# Patient Record
Sex: FEMALE | Race: WHITE | NOT HISPANIC OR LATINO
[De-identification: names, ages, dates, MRNs, and addresses within clinical notes are randomized per-mention and may not be internally consistent; named-entity substitution may affect disease eponyms.]

---

## 2022-01-21 PROBLEM — Z00.00 ENCOUNTER FOR PREVENTIVE HEALTH EXAMINATION: Status: ACTIVE | Noted: 2022-01-21

## 2022-01-25 ENCOUNTER — NON-APPOINTMENT (OUTPATIENT)
Age: 67
End: 2022-01-25

## 2022-01-25 ENCOUNTER — APPOINTMENT (OUTPATIENT)
Dept: GASTROENTEROLOGY | Facility: CLINIC | Age: 67
End: 2022-01-25
Payer: MEDICARE

## 2022-01-25 VITALS
OXYGEN SATURATION: 98 % | TEMPERATURE: 97.8 F | HEIGHT: 68 IN | HEART RATE: 89 BPM | WEIGHT: 138 LBS | BODY MASS INDEX: 20.92 KG/M2 | SYSTOLIC BLOOD PRESSURE: 90 MMHG | DIASTOLIC BLOOD PRESSURE: 60 MMHG

## 2022-01-25 PROCEDURE — 36415 COLL VENOUS BLD VENIPUNCTURE: CPT

## 2022-01-25 PROCEDURE — 99204 OFFICE O/P NEW MOD 45 MIN: CPT | Mod: 25

## 2022-01-26 LAB
ALBUMIN SERPL ELPH-MCNC: 4.5 G/DL
ALP BLD-CCNC: 54 U/L
ALT SERPL-CCNC: 12 U/L
ANION GAP SERPL CALC-SCNC: 13 MMOL/L
AST SERPL-CCNC: 14 U/L
BASOPHILS # BLD AUTO: 0.04 K/UL
BASOPHILS NFR BLD AUTO: 0.6 %
BILIRUB SERPL-MCNC: 0.2 MG/DL
BUN SERPL-MCNC: 16 MG/DL
CALCIUM SERPL-MCNC: 9.9 MG/DL
CHLORIDE SERPL-SCNC: 107 MMOL/L
CO2 SERPL-SCNC: 23 MMOL/L
CREAT SERPL-MCNC: 0.9 MG/DL
CRP SERPL-MCNC: <3 MG/L
EOSINOPHIL # BLD AUTO: 0.07 K/UL
EOSINOPHIL NFR BLD AUTO: 1 %
ERYTHROCYTE [SEDIMENTATION RATE] IN BLOOD BY WESTERGREN METHOD: 2 MM/HR
GLUCOSE SERPL-MCNC: 99 MG/DL
HCT VFR BLD CALC: 41.1 %
HGB BLD-MCNC: 12.6 G/DL
IMM GRANULOCYTES NFR BLD AUTO: 0.3 %
LYMPHOCYTES # BLD AUTO: 1.6 K/UL
LYMPHOCYTES NFR BLD AUTO: 24 %
MAN DIFF?: NORMAL
MCHC RBC-ENTMCNC: 30.7 GM/DL
MCHC RBC-ENTMCNC: 31.7 PG
MCV RBC AUTO: 103.5 FL
MONOCYTES # BLD AUTO: 0.42 K/UL
MONOCYTES NFR BLD AUTO: 6.3 %
NEUTROPHILS # BLD AUTO: 4.53 K/UL
NEUTROPHILS NFR BLD AUTO: 67.8 %
PLATELET # BLD AUTO: 301 K/UL
POTASSIUM SERPL-SCNC: 4.9 MMOL/L
PROT SERPL-MCNC: 6.7 G/DL
RBC # BLD: 3.97 M/UL
RBC # FLD: 11.9 %
SODIUM SERPL-SCNC: 143 MMOL/L
TSH SERPL-ACNC: 2.55 UIU/ML
WBC # FLD AUTO: 6.68 K/UL

## 2022-01-27 LAB
GLIADIN IGA SER QL: <5 UNITS
GLIADIN IGG SER QL: <5 UNITS
GLIADIN PEPTIDE IGA SER-ACNC: NEGATIVE
GLIADIN PEPTIDE IGG SER-ACNC: NEGATIVE

## 2022-02-01 LAB
IGA SER QL IEP: 69 MG/DL
TTG IGA SER IA-ACNC: <1.2 U/ML
TTG IGA SER-ACNC: NEGATIVE
TTG IGG SER IA-ACNC: <1.2 U/ML
TTG IGG SER IA-ACNC: NEGATIVE

## 2022-02-07 ENCOUNTER — APPOINTMENT (OUTPATIENT)
Dept: GASTROENTEROLOGY | Facility: CLINIC | Age: 67
End: 2022-02-07

## 2022-02-08 ENCOUNTER — APPOINTMENT (OUTPATIENT)
Dept: GASTROENTEROLOGY | Facility: CLINIC | Age: 67
End: 2022-02-08
Payer: MEDICARE

## 2022-02-08 DIAGNOSIS — R19.7 DIARRHEA, UNSPECIFIED: ICD-10-CM

## 2022-02-08 PROCEDURE — 36415 COLL VENOUS BLD VENIPUNCTURE: CPT

## 2022-02-10 LAB
C DIFF TOX GENS STL QL NAA+PROBE: NORMAL
CDIFF BY PCR: NOT DETECTED
DEPRECATED KAPPA LC FREE/LAMBDA SER: 1.7 RATIO
FOLATE SERPL-MCNC: 7.6 NG/ML
G LAMBLIA AG STL QL: NORMAL
IGA SER QL IEP: 70 MG/DL
IGG SER QL IEP: 754 MG/DL
IGM SER QL IEP: 146 MG/DL
KAPPA LC CSF-MCNC: 1.33 MG/DL
KAPPA LC SERPL-MCNC: 2.26 MG/DL
VIT B12 SERPL-MCNC: 266 PG/ML

## 2022-02-10 NOTE — ASSESSMENT
[FreeTextEntry1] : positive cologuard, colonoscopy r/o neoplasm\par \par Risks (including but not limited to sedation risks, infection, bleeding, perforation, possibility of missed lesions), benefits and alternatives to procedure, including not doing the procedure, were discussed with patient. Patient understood and agreed to proceed with colonoscopy. \par Colonoscopy preparation instructions reviewed with patient.\par \par 2 Dulcolax two days prior to procedure + Split MiraLAX/Dulcolax preparation \par \par \par Diarrhea, possible IBS, \par labs/stool studies\par Colonoscopy with biopsy , r/o IBD

## 2022-02-10 NOTE — HISTORY OF PRESENT ILLNESS
[FreeTextEntry1] : 66 year old F h/o borderline ovarian ca, s/p oophorectomy/omentum/tubes//appendix no chemo/xrt, bilateral inguinal hernia repair, csection, s/p choley, presents for evaluation of positive cologuard test. \par \par she has copy of result on her phone. \par \par bouts of diarrhea since October. she also had foul smelling stool. \par PMD tested stool for bacteria normal. \par she then requested cologuard , came back positive earlier this month\par continues to have diarrhea off/on. She has h/o chronic fecal urgency. \par \par three prior colonoscopies, last colonoscopy 2017 with Dr. Alanis, normal. \par \par Patient denies abdominal pain , n/v/heartburn, reflux, dysphagia/odynophagia, change in bowel habits, constipation, brbpr, melena. no weight loss.  Good appetite and energy level. Patient has daily BM, denies regular NSAID use. \par \par PMD Dr. Aron Junior\par \par fam hx\par paternal gm- CRC,  in her 50s.

## 2022-02-10 NOTE — ADDENDUM
[FreeTextEntry1] : 2/10- spoke with patient. reviewed blood work, low IgA, elevated kappa free light chains. \par low b12. \par referred to hematology for further evaluation. \par stool tests thus far negative.

## 2022-02-11 ENCOUNTER — TRANSCRIPTION ENCOUNTER (OUTPATIENT)
Age: 67
End: 2022-02-11

## 2022-02-12 LAB — DEPRECATED O AND P PREP STL: NORMAL

## 2022-02-13 LAB — BACTERIA STL CULT: NORMAL

## 2022-02-16 LAB
CALPROTECTIN FECAL: <16 UG/G
PANCREATIC ELASTASE, FECAL: >500

## 2022-03-04 ENCOUNTER — APPOINTMENT (OUTPATIENT)
Dept: HEMATOLOGY ONCOLOGY | Facility: CLINIC | Age: 67
End: 2022-03-04
Payer: MEDICARE

## 2022-03-04 ENCOUNTER — RESULT REVIEW (OUTPATIENT)
Age: 67
End: 2022-03-04

## 2022-03-04 VITALS
HEART RATE: 16 BPM | DIASTOLIC BLOOD PRESSURE: 75 MMHG | RESPIRATION RATE: 18 BRPM | TEMPERATURE: 97.3 F | WEIGHT: 139.13 LBS | OXYGEN SATURATION: 97 % | HEIGHT: 68 IN | SYSTOLIC BLOOD PRESSURE: 104 MMHG | BODY MASS INDEX: 21.09 KG/M2

## 2022-03-04 DIAGNOSIS — R19.5 OTHER FECAL ABNORMALITIES: ICD-10-CM

## 2022-03-04 DIAGNOSIS — R10.9 UNSPECIFIED ABDOMINAL PAIN: ICD-10-CM

## 2022-03-04 DIAGNOSIS — R82.90 UNSPECIFIED ABNORMAL FINDINGS IN URINE: ICD-10-CM

## 2022-03-04 DIAGNOSIS — N63.0 UNSPECIFIED LUMP IN UNSPECIFIED BREAST: ICD-10-CM

## 2022-03-04 DIAGNOSIS — N60.19 DIFFUSE CYSTIC MASTOPATHY OF UNSPECIFIED BREAST: ICD-10-CM

## 2022-03-04 DIAGNOSIS — C56.9 MALIGNANT NEOPLASM OF UNSPECIFIED OVARY: ICD-10-CM

## 2022-03-04 PROCEDURE — 36415 COLL VENOUS BLD VENIPUNCTURE: CPT

## 2022-03-04 PROCEDURE — 99205 OFFICE O/P NEW HI 60 MIN: CPT | Mod: 25

## 2022-03-04 NOTE — HISTORY OF PRESENT ILLNESS
[de-identified] : Ms. Orin Villegas is 66 year old female referred by Rosalia Heller for abnormal lab work.  \par No symptoms other than diarrhea and foul smelling stools and urine +Coluguard.  No recent fevers or infections \par \par Patient is a retired .  \par \par Patient with past medical history of ovarian cancer in  s/p oophorectomy/omentum/tubes/appendix but no chemo or radiation.  Cystic breasts, ovaries.  \par \par Surgical history: \par Gallbladder Removal \par  \par LEEP procedure in cervix .\par \par Family History: \par Paternal grandmother: Colon cancer? 50s \par Father: Sinus Cancer 73\par \par Menarche: 16   \par A3\par Oral birth control for about a year 40 years ago\par IVF 27 years ago for 2 years \par  \par 2/10/22  FLCR 1.70  Kappa - 2.26 \par 22 mcv 103.5 Hgb 12.6 hct 41.1

## 2022-03-04 NOTE — ASSESSMENT
[FreeTextEntry1] : Macrocytosis without anemia\par Elevated free light chains\par Symptoms: Fatigue, early satiety, foul smelling stool/urine\par Had positive cologuard\par Scheduled for colonoscopy 3/15/22\par Send SPEP and IFx\par B12, folate, PROSPER, ESR, CRP\par \par "Borderline" Ovarian cancer\par Diagnosed 2005\par s/p KRISTINA BSO at [facility] [address] [place]\par Was told Stage IIIC\par Was never offered chemotherapy\par Was getting annual CT until Last CT 2018\par Obtain outside records\par Repeat scans given symptomatology\par \par Social hx\[place]par Retired 12 months back\par Work contracts for \par Quit smoking 1 ppd x 10 years. Quit 27 years ago\par She admits to being hypochondriac\par \par Family Hx of cancer\par P GM throat/ thyroid\par Father sinus cancer\par Paternal aunt- breast cancer 60\par Sister - lung -smoker\par Interested in genetic testing\par Send invitae 47 gene panel\par \par Patient had multiple questions which were answered to satisfaction\par \par Follow up in a few weeks\par No labs

## 2022-03-04 NOTE — PHYSICAL EXAM
[Restricted in physically strenuous activity but ambulatory and able to carry out work of a light or sedentary nature] : Status 1- Restricted in physically strenuous activity but ambulatory and able to carry out work of a light or sedentary nature, e.g., light house work, office work [Normal] : affect appropriate [de-identified] : Dense cystic breast. No obvious masses bL

## 2022-03-11 ENCOUNTER — APPOINTMENT (OUTPATIENT)
Dept: ORTHOPEDIC SURGERY | Facility: CLINIC | Age: 67
End: 2022-03-11
Payer: MEDICARE

## 2022-03-11 VITALS — HEIGHT: 68 IN | RESPIRATION RATE: 16 BRPM | BODY MASS INDEX: 21.07 KG/M2 | WEIGHT: 139 LBS

## 2022-03-11 DIAGNOSIS — M72.0 PALMAR FASCIAL FIBROMATOSIS [DUPUYTREN]: ICD-10-CM

## 2022-03-11 PROCEDURE — 73130 X-RAY EXAM OF HAND: CPT | Mod: 50

## 2022-03-11 PROCEDURE — 99204 OFFICE O/P NEW MOD 45 MIN: CPT

## 2022-03-12 ENCOUNTER — RESULT REVIEW (OUTPATIENT)
Age: 67
End: 2022-03-12

## 2022-03-14 ENCOUNTER — RESULT REVIEW (OUTPATIENT)
Age: 67
End: 2022-03-14

## 2022-03-15 ENCOUNTER — RESULT REVIEW (OUTPATIENT)
Age: 67
End: 2022-03-15

## 2022-03-15 ENCOUNTER — APPOINTMENT (OUTPATIENT)
Dept: GASTROENTEROLOGY | Facility: HOSPITAL | Age: 67
End: 2022-03-15
Payer: MEDICARE

## 2022-03-15 ENCOUNTER — NON-APPOINTMENT (OUTPATIENT)
Age: 67
End: 2022-03-15

## 2022-03-15 PROCEDURE — 45380 COLONOSCOPY AND BIOPSY: CPT

## 2022-03-25 ENCOUNTER — APPOINTMENT (OUTPATIENT)
Dept: HEMATOLOGY ONCOLOGY | Facility: CLINIC | Age: 67
End: 2022-03-25
Payer: MEDICARE

## 2022-03-25 VITALS
BODY MASS INDEX: 21 KG/M2 | HEIGHT: 68 IN | WEIGHT: 138.56 LBS | RESPIRATION RATE: 18 BRPM | TEMPERATURE: 98.3 F | SYSTOLIC BLOOD PRESSURE: 95 MMHG | DIASTOLIC BLOOD PRESSURE: 64 MMHG | OXYGEN SATURATION: 97 % | HEART RATE: 95 BPM

## 2022-03-25 DIAGNOSIS — R76.8 OTHER SPECIFIED ABNORMAL IMMUNOLOGICAL FINDINGS IN SERUM: ICD-10-CM

## 2022-03-25 PROCEDURE — 99214 OFFICE O/P EST MOD 30 MIN: CPT | Mod: 25

## 2022-03-25 NOTE — ASSESSMENT
[FreeTextEntry1] : Macrocytosis without anemia\par Elevated free light chains\par Symptoms: Fatigue, early satiety, foul smelling stool/urine\par Had positive cologuard\par S/p colonoscopy 3/15/22 - tubular adenomas\par B12, folate, PROSPER, ESR, CRP- unremarkable\par SPEP, IFx- no monoclonal protein\par Slightly elevated Kappa chains. Cannot absolutely rule out MGUS, but even if we diagnose it, most likely we will just monitor it. Discussed about bone marrow\par She prefers to monitor\par \par "Borderline" Ovarian cancer\par Diagnosed 2005\par s/p KRISTINA BSO at [facility] [place]\par Was told Stage IIIC\par Was never offered chemotherapy\par Was getting annual CT until Last CT 2018\par Obtain outside records\par Refer to gyn\par Strongly encouraged to be uptodate with mammogram\par \par Social hx\par [place]par Retired 12 months back\par Work contracts for \par Quit smoking 1 ppd x 10 years. Quit 27 years ago\par She admits to being hypochondriac\par \par Family Hx of cancer\par P GM throat/ thyroid\par Father sinus cancer\par Paternal aunt- breast cancer 60\par Sister - lung -smoker\par invitae 47 gene panel- negative\par \par Patient had multiple questions which were answered to satisfaction\par \par Labs in 6 months\par CBC, CMP, myeloma panel, \par OV few days later

## 2022-03-25 NOTE — HISTORY OF PRESENT ILLNESS
[de-identified] : Ms. Orin Villegas is 66 year old female referred by Rosalia Heller for abnormal lab work.  \par No symptoms other than diarrhea and foul smelling stools and urine +Coluguard.  No recent fevers or infections \par \par Patient is a retired .  \par \par Patient with past medical history of ovarian cancer in  s/p oophorectomy/omentum/tubes/appendix but no chemo or radiation.  Cystic breasts, ovaries.  \par \par Surgical history: \par Gallbladder Removal \par  \par LEEP procedure in cervix .\par \par Family History: \par Paternal grandmother: Colon cancer? 50s \par Father: Sinus Cancer 73\par \par Menarche: 16   \par A3\par Oral birth control for about a year 40 years ago\par IVF 27 years ago for 2 years \par  \par 2/10/22  FLCR 1.70  Kappa - 2.26 \par 22 mcv 103.5 Hgb 12.6 hct 41.1 [de-identified] : Patient is seen today for follow up\par \par No new symptoms

## 2022-05-18 DIAGNOSIS — Z87.19 PERSONAL HISTORY OF OTHER DISEASES OF THE DIGESTIVE SYSTEM: ICD-10-CM

## 2022-05-18 DIAGNOSIS — K92.1 MELENA: ICD-10-CM

## 2022-05-21 ENCOUNTER — RESULT REVIEW (OUTPATIENT)
Age: 67
End: 2022-05-21

## 2022-05-23 ENCOUNTER — RESULT REVIEW (OUTPATIENT)
Age: 67
End: 2022-05-23

## 2022-05-24 ENCOUNTER — APPOINTMENT (OUTPATIENT)
Dept: GASTROENTEROLOGY | Facility: HOSPITAL | Age: 67
End: 2022-05-24
Payer: MEDICARE

## 2022-05-24 PROCEDURE — 43239 EGD BIOPSY SINGLE/MULTIPLE: CPT

## 2022-06-09 ENCOUNTER — APPOINTMENT (OUTPATIENT)
Dept: GASTROENTEROLOGY | Facility: CLINIC | Age: 67
End: 2022-06-09
Payer: MEDICARE

## 2022-06-09 VITALS
WEIGHT: 140 LBS | DIASTOLIC BLOOD PRESSURE: 78 MMHG | HEART RATE: 77 BPM | BODY MASS INDEX: 21.22 KG/M2 | SYSTOLIC BLOOD PRESSURE: 114 MMHG | HEIGHT: 68 IN | TEMPERATURE: 97.9 F

## 2022-06-09 DIAGNOSIS — R19.5 OTHER FECAL ABNORMALITIES: ICD-10-CM

## 2022-06-09 DIAGNOSIS — R10.13 EPIGASTRIC PAIN: ICD-10-CM

## 2022-06-09 DIAGNOSIS — K62.5 HEMORRHAGE OF ANUS AND RECTUM: ICD-10-CM

## 2022-06-09 PROCEDURE — 99214 OFFICE O/P EST MOD 30 MIN: CPT

## 2022-06-09 NOTE — ASSESSMENT
[FreeTextEntry1] : abd pain, change in bowel habits with denise colored stool\par -labs, CT\par \par h/o rectal bleeding, with report of both melena and chilango stools. unrevealing EGD, colonoscopy notable for hemorrhoids\par -check labs\par -VCE pending CT scan. \par

## 2022-06-09 NOTE — PHYSICAL EXAM
[General Appearance - Alert] : alert [General Appearance - In No Acute Distress] : in no acute distress [Sclera] : the sclera and conjunctiva were normal [Outer Ear] : the ears and nose were normal in appearance [Neck Appearance] : the appearance of the neck was normal [] : no respiratory distress [Apical Impulse] : the apical impulse was normal [Abdomen Soft] : soft [Abdomen Tenderness] : non-tender [Abnormal Walk] : normal gait [Skin Color & Pigmentation] : normal skin color and pigmentation [Oriented To Time, Place, And Person] : oriented to person, place, and time [FreeTextEntry1] : normal external exam, normal DARCY. scant amount of brown stool. Chaperoned by Jelani Magdaleno MA

## 2022-06-09 NOTE — HISTORY OF PRESENT ILLNESS
[FreeTextEntry1] : 67 year old F h/o borderline ovarian ca, s/p oophorectomy/omentum/tubes//appendix no chemo/xrt, bilateral inguinal hernia repair, csection, s/p choley, presents for follow up. \par \par no normal bm since 10/2022. \par baseline bowel patter n 2x per week. since 10/2022, daily bm, soft, formed, easy. also intermittent bouts of diarrhea\par smell has improved. \par She previously reported black stool with maroon blood. more recently she has had denise colored stools with red. \par Last denise stool was this morning, prior to this was 2-3 days ago. \par \par she had excruciating pain earlier this week, improved with a glass of milk. no other abd pain. \par feels full easily sometimes, weight is stable. \par \par Labs after visit in 2022:\par 2/10- spoke with patient. reviewed blood work, low IgA, elevated kappa free light chains. \par low b12. \par referred to hematology for further evaluation. found to possibly had MGUS, f/u scheduled in 2022\par stool tests thus far negative. \par \par Colonoscopy-2022 positive cologuard- diverticulosis, random bx unremarkable.\par subcentimeter colon polyps , path TA and hyperplastic\par \par EGD for h/o melena 2022\par grade A esophagitis, normal stomach, normal duodenum. \par path hyperplastic changes in distal esophagus, benign gastric bx\par \par three prior colonoscopies, last colonoscopy 2017 with Dr. Alanis, normal. \par \par Patient denies abdominal pain , n/v/heartburn, reflux, dysphagia/odynophagia, change in bowel habits, constipation, brbpr, melena. no weight loss.  Good appetite and energy level. Patient has daily BM, denies regular NSAID use. \par \par PMD Dr. Aron Junior\par \par fam hx\par paternal gm- CRC,  in her 50s.

## 2022-06-10 LAB
ALBUMIN SERPL ELPH-MCNC: 5 G/DL
ALP BLD-CCNC: 62 U/L
ALT SERPL-CCNC: 14 U/L
ANION GAP SERPL CALC-SCNC: 13 MMOL/L
AST SERPL-CCNC: 15 U/L
BASOPHILS # BLD AUTO: 0.03 K/UL
BASOPHILS NFR BLD AUTO: 0.4 %
BILIRUB SERPL-MCNC: 0.2 MG/DL
BUN SERPL-MCNC: 20 MG/DL
CALCIUM SERPL-MCNC: 9.8 MG/DL
CHLORIDE SERPL-SCNC: 105 MMOL/L
CO2 SERPL-SCNC: 25 MMOL/L
CREAT SERPL-MCNC: 1.17 MG/DL
EGFR: 51 ML/MIN/1.73M2
EOSINOPHIL # BLD AUTO: 0.12 K/UL
EOSINOPHIL NFR BLD AUTO: 1.6 %
GLUCOSE SERPL-MCNC: 101 MG/DL
HCT VFR BLD CALC: 39.2 %
HGB BLD-MCNC: 12.5 G/DL
IMM GRANULOCYTES NFR BLD AUTO: 0.5 %
LPL SERPL-CCNC: 60 U/L
LYMPHOCYTES # BLD AUTO: 1.57 K/UL
LYMPHOCYTES NFR BLD AUTO: 20.5 %
MAN DIFF?: NORMAL
MCHC RBC-ENTMCNC: 31.9 GM/DL
MCHC RBC-ENTMCNC: 32.5 PG
MCV RBC AUTO: 101.8 FL
MONOCYTES # BLD AUTO: 0.48 K/UL
MONOCYTES NFR BLD AUTO: 6.3 %
NEUTROPHILS # BLD AUTO: 5.41 K/UL
NEUTROPHILS NFR BLD AUTO: 70.7 %
PLATELET # BLD AUTO: 319 K/UL
POTASSIUM SERPL-SCNC: 4.7 MMOL/L
PROT SERPL-MCNC: 7.1 G/DL
RBC # BLD: 3.85 M/UL
RBC # FLD: 12.3 %
SODIUM SERPL-SCNC: 142 MMOL/L
WBC # FLD AUTO: 7.65 K/UL

## 2022-06-13 ENCOUNTER — RESULT REVIEW (OUTPATIENT)
Age: 67
End: 2022-06-13

## 2022-06-16 ENCOUNTER — APPOINTMENT (OUTPATIENT)
Dept: GASTROENTEROLOGY | Facility: HOSPITAL | Age: 67
End: 2022-06-16

## 2022-06-17 ENCOUNTER — APPOINTMENT (OUTPATIENT)
Dept: GYNECOLOGIC ONCOLOGY | Facility: CLINIC | Age: 67
End: 2022-06-17
Payer: MEDICARE

## 2022-06-17 VITALS
OXYGEN SATURATION: 97 % | HEIGHT: 68 IN | BODY MASS INDEX: 20.61 KG/M2 | SYSTOLIC BLOOD PRESSURE: 107 MMHG | HEART RATE: 89 BPM | WEIGHT: 136 LBS | RESPIRATION RATE: 18 BRPM | DIASTOLIC BLOOD PRESSURE: 73 MMHG

## 2022-06-17 DIAGNOSIS — Z80.3 FAMILY HISTORY OF MALIGNANT NEOPLASM OF BREAST: ICD-10-CM

## 2022-06-17 DIAGNOSIS — Z80.0 FAMILY HISTORY OF MALIGNANT NEOPLASM OF DIGESTIVE ORGANS: ICD-10-CM

## 2022-06-17 PROCEDURE — 99204 OFFICE O/P NEW MOD 45 MIN: CPT

## 2022-06-21 LAB
HPV 16 E6+E7 MRNA CVX QL NAA+PROBE: NOT DETECTED
HPV18+45 E6+E7 MRNA CVX QL NAA+PROBE: NOT DETECTED

## 2022-06-21 NOTE — PHYSICAL EXAM
[Chaperone Present] : A chaperone was present in the examining room during all aspects of the physical examination [Absent] : Adnexa(ae): Absent [Normal] : Recto-Vaginal Exam: Normal [de-identified] : soft [de-identified] : scant physiologic discharge, no odor, no lesions, no blood [de-identified] : pap obtained [de-identified] : small uterus, nontender, no masses [de-identified] : smooth septum, no masses, no blood [Fully active, able to carry on all pre-disease performance without restriction] : Status 0 - Fully active, able to carry on all pre-disease performance without restriction

## 2022-06-21 NOTE — HISTORY OF PRESENT ILLNESS
[FreeTextEntry1] : 66yo P1 here to establish care\par \par Dx: Borderline ovarian tumor dx'd 2005\par Tx: BSO. retained uterus but was told non-invasive implants on uterus\par Adj tx: none\par \par Patient has not seen a GYN in many years and desires to establish care given her history of borderline tumor and although she was diagnosed >15yrs ago, she is concerned because risk for recurrence of these tumors is usually 15-20yrs after diagnosis. She denies pain/fever/bleeding/bloating. She has normal activity tolerance and normal appetite. Reports normal urination. She does report + blood in stool undergoing GI work up. She explains that shes is In a new sexual relationship and partner w/ hx of HSV. She denies any vulvar or vaginal lesions. She does admit to a new vaginal discharge and odor since engaging in sexual activity with her new partner, denies itching/burning/bleeding. She desires HSV testing and general STI testing.\par \par Follows with GI for new BRBPR , and with Medonc for macrocytosis\par

## 2022-06-23 LAB — CYTOLOGY CVX/VAG DOC THIN PREP: ABNORMAL

## 2022-06-27 ENCOUNTER — RESULT REVIEW (OUTPATIENT)
Age: 67
End: 2022-06-27

## 2022-06-28 ENCOUNTER — APPOINTMENT (OUTPATIENT)
Dept: GYNECOLOGIC ONCOLOGY | Facility: CLINIC | Age: 67
End: 2022-06-28

## 2022-06-30 ENCOUNTER — APPOINTMENT (OUTPATIENT)
Dept: GYNECOLOGIC ONCOLOGY | Facility: CLINIC | Age: 67
End: 2022-06-30

## 2022-06-30 DIAGNOSIS — D39.10 NEOPLASM OF UNCERTAIN BEHAVIOR OF UNSPECIFIED OVARY: ICD-10-CM

## 2022-06-30 DIAGNOSIS — R87.610 ATYPICAL SQUAMOUS CELLS OF UNDETERMINED SIGNIFICANCE ON CYTOLOGIC SMEAR OF CERVIX (ASC-US): ICD-10-CM

## 2022-06-30 PROCEDURE — 99212 OFFICE O/P EST SF 10 MIN: CPT | Mod: 95

## 2022-08-03 ENCOUNTER — APPOINTMENT (OUTPATIENT)
Dept: INTERNAL MEDICINE | Facility: CLINIC | Age: 67
End: 2022-08-03

## 2022-09-09 ENCOUNTER — RX RENEWAL (OUTPATIENT)
Age: 67
End: 2022-09-09

## 2022-09-11 ENCOUNTER — RX RENEWAL (OUTPATIENT)
Age: 67
End: 2022-09-11

## 2022-09-11 RX ORDER — OMEPRAZOLE 40 MG/1
40 CAPSULE, DELAYED RELEASE ORAL
Qty: 90 | Refills: 0 | Status: ACTIVE | COMMUNITY
Start: 2022-05-18 | End: 1900-01-01

## 2022-09-12 DIAGNOSIS — R76.8 OTHER SPECIFIED ABNORMAL IMMUNOLOGICAL FINDINGS IN SERUM: ICD-10-CM

## 2022-09-16 ENCOUNTER — APPOINTMENT (OUTPATIENT)
Dept: HEMATOLOGY ONCOLOGY | Facility: CLINIC | Age: 67
End: 2022-09-16

## 2022-09-18 ENCOUNTER — RESULT REVIEW (OUTPATIENT)
Age: 67
End: 2022-09-18

## 2022-09-21 ENCOUNTER — APPOINTMENT (OUTPATIENT)
Dept: GASTROENTEROLOGY | Facility: HOSPITAL | Age: 67
End: 2022-09-21

## 2022-09-23 ENCOUNTER — APPOINTMENT (OUTPATIENT)
Dept: HEMATOLOGY ONCOLOGY | Facility: CLINIC | Age: 67
End: 2022-09-23